# Patient Record
Sex: MALE | Race: WHITE
[De-identification: names, ages, dates, MRNs, and addresses within clinical notes are randomized per-mention and may not be internally consistent; named-entity substitution may affect disease eponyms.]

---

## 2018-02-02 ENCOUNTER — HOSPITAL ENCOUNTER (INPATIENT)
Dept: HOSPITAL 17 - PHED | Age: 73
LOS: 1 days | Discharge: HOME | DRG: 389 | End: 2018-02-03
Attending: HOSPITALIST | Admitting: HOSPITALIST
Payer: OTHER GOVERNMENT

## 2018-02-02 VITALS
HEART RATE: 82 BPM | TEMPERATURE: 98.7 F | OXYGEN SATURATION: 94 % | DIASTOLIC BLOOD PRESSURE: 71 MMHG | SYSTOLIC BLOOD PRESSURE: 119 MMHG | RESPIRATION RATE: 20 BRPM

## 2018-02-02 VITALS
HEART RATE: 71 BPM | RESPIRATION RATE: 18 BRPM | SYSTOLIC BLOOD PRESSURE: 120 MMHG | OXYGEN SATURATION: 97 % | DIASTOLIC BLOOD PRESSURE: 71 MMHG

## 2018-02-02 VITALS
SYSTOLIC BLOOD PRESSURE: 105 MMHG | RESPIRATION RATE: 20 BRPM | DIASTOLIC BLOOD PRESSURE: 69 MMHG | OXYGEN SATURATION: 98 % | HEART RATE: 68 BPM

## 2018-02-02 VITALS
SYSTOLIC BLOOD PRESSURE: 106 MMHG | TEMPERATURE: 98 F | DIASTOLIC BLOOD PRESSURE: 77 MMHG | OXYGEN SATURATION: 96 % | RESPIRATION RATE: 18 BRPM | HEART RATE: 66 BPM

## 2018-02-02 VITALS — OXYGEN SATURATION: 95 %

## 2018-02-02 VITALS — HEIGHT: 70 IN | BODY MASS INDEX: 24.87 KG/M2 | WEIGHT: 173.72 LBS

## 2018-02-02 VITALS
DIASTOLIC BLOOD PRESSURE: 62 MMHG | OXYGEN SATURATION: 99 % | HEART RATE: 66 BPM | SYSTOLIC BLOOD PRESSURE: 105 MMHG | RESPIRATION RATE: 20 BRPM | TEMPERATURE: 97 F

## 2018-02-02 VITALS
OXYGEN SATURATION: 97 % | RESPIRATION RATE: 18 BRPM | DIASTOLIC BLOOD PRESSURE: 75 MMHG | SYSTOLIC BLOOD PRESSURE: 114 MMHG | HEART RATE: 64 BPM

## 2018-02-02 VITALS
OXYGEN SATURATION: 95 % | RESPIRATION RATE: 18 BRPM | SYSTOLIC BLOOD PRESSURE: 116 MMHG | HEART RATE: 61 BPM | TEMPERATURE: 97.9 F | DIASTOLIC BLOOD PRESSURE: 72 MMHG

## 2018-02-02 VITALS
OXYGEN SATURATION: 94 % | RESPIRATION RATE: 20 BRPM | DIASTOLIC BLOOD PRESSURE: 81 MMHG | SYSTOLIC BLOOD PRESSURE: 114 MMHG | HEART RATE: 70 BPM | TEMPERATURE: 97 F

## 2018-02-02 VITALS
SYSTOLIC BLOOD PRESSURE: 112 MMHG | RESPIRATION RATE: 18 BRPM | HEART RATE: 68 BPM | OXYGEN SATURATION: 93 % | TEMPERATURE: 98.3 F | DIASTOLIC BLOOD PRESSURE: 70 MMHG

## 2018-02-02 VITALS
DIASTOLIC BLOOD PRESSURE: 75 MMHG | HEART RATE: 71 BPM | SYSTOLIC BLOOD PRESSURE: 114 MMHG | OXYGEN SATURATION: 97 % | RESPIRATION RATE: 20 BRPM

## 2018-02-02 VITALS — OXYGEN SATURATION: 96 %

## 2018-02-02 DIAGNOSIS — K52.9: ICD-10-CM

## 2018-02-02 DIAGNOSIS — E86.0: ICD-10-CM

## 2018-02-02 DIAGNOSIS — K86.89: ICD-10-CM

## 2018-02-02 DIAGNOSIS — E87.1: ICD-10-CM

## 2018-02-02 DIAGNOSIS — N20.0: ICD-10-CM

## 2018-02-02 DIAGNOSIS — K56.7: Primary | ICD-10-CM

## 2018-02-02 DIAGNOSIS — N28.1: ICD-10-CM

## 2018-02-02 DIAGNOSIS — F10.20: ICD-10-CM

## 2018-02-02 DIAGNOSIS — K76.89: ICD-10-CM

## 2018-02-02 LAB
ALBUMIN SERPL-MCNC: 4.1 GM/DL (ref 3.4–5)
ALP SERPL-CCNC: 75 U/L (ref 45–117)
ALT SERPL-CCNC: 24 U/L (ref 12–78)
AST SERPL-CCNC: 31 U/L (ref 15–37)
BACTERIA #/AREA URNS HPF: (no result) /HPF
BASOPHILS # BLD AUTO: 0.2 TH/MM3 (ref 0–0.2)
BASOPHILS NFR BLD: 2.2 % (ref 0–2)
BILIRUB SERPL-MCNC: 0.8 MG/DL (ref 0.2–1)
BUN SERPL-MCNC: 25 MG/DL (ref 7–18)
CALCIUM SERPL-MCNC: 10 MG/DL (ref 8.5–10.1)
CHLORIDE SERPL-SCNC: 100 MEQ/L (ref 98–107)
COLOR UR: YELLOW
CREAT SERPL-MCNC: 1.2 MG/DL (ref 0.6–1.3)
EOSINOPHIL # BLD: 0 TH/MM3 (ref 0–0.4)
EOSINOPHIL NFR BLD: 0.1 % (ref 0–4)
ERYTHROCYTE [DISTWIDTH] IN BLOOD BY AUTOMATED COUNT: 11.9 % (ref 11.6–17.2)
GFR SERPLBLD BASED ON 1.73 SQ M-ARVRAT: 59 ML/MIN (ref 89–?)
GLUCOSE SERPL-MCNC: 132 MG/DL (ref 74–106)
GLUCOSE UR STRIP-MCNC: (no result) MG/DL
HCO3 BLD-SCNC: 27.3 MEQ/L (ref 21–32)
HCT VFR BLD CALC: 44.9 % (ref 39–51)
HGB BLD-MCNC: 14.9 GM/DL (ref 13–17)
HGB UR QL STRIP: (no result)
KETONES UR STRIP-MCNC: 15 MG/DL
LEUKOCYTE ESTERASE UR QL STRIP: (no result) /HPF (ref 0–5)
LYMPHOCYTES # BLD AUTO: 0.8 TH/MM3 (ref 1–4.8)
LYMPHOCYTES NFR BLD AUTO: 6.8 % (ref 9–44)
MCH RBC QN AUTO: 32.7 PG (ref 27–34)
MCHC RBC AUTO-ENTMCNC: 33.3 % (ref 32–36)
MCV RBC AUTO: 98.2 FL (ref 80–100)
MONOCYTE #: 0.3 TH/MM3 (ref 0–0.9)
MONOCYTES NFR BLD: 3 % (ref 0–8)
NEUTROPHILS # BLD AUTO: 9.9 TH/MM3 (ref 1.8–7.7)
NEUTROPHILS NFR BLD AUTO: 87.9 % (ref 16–70)
NITRITE UR QL STRIP: (no result)
PLATELET # BLD: 132 TH/MM3 (ref 150–450)
PMV BLD AUTO: 10.6 FL (ref 7–11)
PROT SERPL-MCNC: 7.5 GM/DL (ref 6.4–8.2)
RBC # BLD AUTO: 4.58 MIL/MM3 (ref 4.5–5.9)
RBC #/AREA URNS HPF: (no result) /HPF (ref 0–3)
SODIUM SERPL-SCNC: 135 MEQ/L (ref 136–145)
SP GR UR STRIP: 1.02 (ref 1–1.03)
SQUAMOUS #/AREA URNS HPF: (no result) /HPF (ref 0–5)
URINE LEUKOCYTE ESTERASE: (no result)
WBC # BLD AUTO: 11.2 TH/MM3 (ref 4–11)

## 2018-02-02 PROCEDURE — 83690 ASSAY OF LIPASE: CPT

## 2018-02-02 PROCEDURE — 96375 TX/PRO/DX INJ NEW DRUG ADDON: CPT

## 2018-02-02 PROCEDURE — 83735 ASSAY OF MAGNESIUM: CPT

## 2018-02-02 PROCEDURE — 81001 URINALYSIS AUTO W/SCOPE: CPT

## 2018-02-02 PROCEDURE — 96374 THER/PROPH/DIAG INJ IV PUSH: CPT

## 2018-02-02 PROCEDURE — 85025 COMPLETE CBC W/AUTO DIFF WBC: CPT

## 2018-02-02 PROCEDURE — 80053 COMPREHEN METABOLIC PANEL: CPT

## 2018-02-02 PROCEDURE — 74019 RADEX ABDOMEN 2 VIEWS: CPT

## 2018-02-02 PROCEDURE — 96361 HYDRATE IV INFUSION ADD-ON: CPT

## 2018-02-02 PROCEDURE — 74177 CT ABD & PELVIS W/CONTRAST: CPT

## 2018-02-02 RX ADMIN — Medication SCH ML: at 21:27

## 2018-02-02 RX ADMIN — Medication SCH ML: at 09:00

## 2018-02-02 RX ADMIN — PHENYTOIN SODIUM SCH MLS/HR: 50 INJECTION INTRAMUSCULAR; INTRAVENOUS at 16:44

## 2018-02-02 RX ADMIN — PHENYTOIN SODIUM SCH MLS/HR: 50 INJECTION INTRAMUSCULAR; INTRAVENOUS at 06:40

## 2018-02-02 NOTE — HHI.HP
__________________________________________________





[facility]


Service


[facility]


Primary Care Physician


[facility]


Admission Diagnosis





small bowel obstruction, dehydration


Diagnoses:  


Travel History


International Travel<30 Days:  No


Contact w/Intl Traveler <30 Da:  No


Traveled to Known Affected Are:  No


History of Present Illness


This patient is a 72-year-old gentleman with minimal past medical history comes 

in with acute onset of abdominal pain with associated vomiting.  The pain is 

described as 8 out of 10, constant and sharp and worse in the left side.  It is 

improved with IV Dilaudid.  The pain goes from 8-6.  He used to be a heavy 

drinker but now drinks only 2 beers a day.  He is active and exercises daily.  

In the past he has had problems with pancreatitis and was prescribed pancreas 

enzymes however had stopped taking them.  He now takes ibuprofen as needed for 

pain.  Patient has not had a fever or chills.  He is mildly elevated white cell 

count and some mild hyponatremia.  The patient's recommend for admission due to 

obstruction which is seen clinically and on CT of abdomen pelvis





Review of Systems


Constitutional:  DENIES: Diaphoretic episodes, Fatigue, Fever, Weight gain, 

Weight loss, Chills, Dizziness, Change in appetite, Night Sweats


Endocrine:  DENIES: Heat/cold intolerance, Polydipsia, Polyuria, Polyphagia


Eyes:  DENIES: Blurred vision, Diplopia, Eye inflammation, Eye pain, Vision loss

, Photosensitivity, Double Vision


Ears, nose, mouth, throat:  DENIES: Tinnitus, Hearing loss, Vertigo, Nasal 

discharge, Oral lesions, Throat pain, Hoarseness, Ear Pain, Running Nose, 

Epistaxis, Sinus Pain, Toothache, Odynophagia


Respiratory:  DENIES: Apneas, Cough, Snoring, Wheezing, Hemoptysis, Sputum 

production, Shortness of breath


Cardiovascular:  DENIES: Chest pain, Palpitations, Syncope, Dyspnea on Exertion

, PND, Lower Extremity Edema, Orthopnea, Claudication


Gastrointestinal:  COMPLAINS OF: Abdominal pain, Nausea


Genitourinary:  DENIES: Sexual dysfunction, Urinary frequency, Urinary 

incontinence, Urgency, Hematuria, Dysuria, Nocturia, Penile Discharge, 

Testicular Pain, Testicular Swelling


Integumentary:  DENIES: Abnormal pigmentation, Nail changes, Pruritus, Rash


Hematologic/lymphatic:  DENIES: Bruising, Lymphadenopathy


Immunologic/allergic:  DENIES: Eczema, Urticaria


Neurologic:  DENIES: Abnormal gait, Headache, Localized weakness, Paresthesias, 

Seizures, Speech Problems, Tremor, Poor Balance


Psychiatric:  DENIES: Anxiety, Confusion, Mood changes, Depression, 

Hallucinations, Agitation, Suicidal Ideation, Homicidal Ideation, Delusions


Except as stated in HPI:  all other systems reviewed are Neg





Past Family Social History


Past Medical History


History of pancreatic insufficiency


Past Surgical History


Left inguinal hernia repair


Reported Medications


Reviewed in the EMR


Allergies:  


Coded Allergies:  


     No Known Allergies (Unverified , 18)


Active Ordered Medications


Reviewed in the EMR


Family History


Mother  from cancer at 65 father  from old age and a 4


Social History


No current tobacco, alcohol daily but previous history of excessive alcohol 

dependency and pancreatic insufficiency





Physical Exam


Vital Signs





Vital Signs








  Date Time  Temp Pulse Resp B/P (MAP) Pulse Ox O2 Delivery O2 Flow Rate FiO2


 


18 08:26     95   21


 


18 08:12     96 Nasal Cannula 2.00 


 


18 08:00 97.9 61 18 116/72 (87) 95   


 


18 06:24 97.0 70 20 114/81 (92) 94   


 


18 05:54  68 20 105/69 (81) 98   


 


18 04:50   16     


 


18 04:31  71 20 114/75 (88) 97   


 


18 04:23  64 18 114/75 (88) 97 Room Air  


 


18 03:09  71 18 120/71 (87) 97   


 


18 02:30   20     


 


18 02:05 97.0 66 20 105/62 (76) 99   








Physical Exam


GENERAL: This is a well-nourished, well-developed patient, complaining of 

abdominal pain


SKIN: No rashes, ecchymoses or lesions. Cool and dry.


HEAD: Atraumatic. Normocephalic. No temporal or scalp tenderness.


EYES: Pupils equal round and reactive. Extraocular motions intact. No scleral 

icterus. No injection or drainage. 


ENT: Nose without bleeding, purulent drainage or septal hematoma. Throat 

without erythema, tonsillar hypertrophy or exudate. Uvula midline. Airway 

patent.


NECK: Trachea midline. No JVD or lymphadenopathy. Supple, nontender, no 

meningeal signs.


CARDIOVASCULAR: Regular rate and rhythm without murmurs, gallops, or rubs. 


RESPIRATORY: Clear to auscultation. Breath sounds equal bilaterally. No wheezes

, rales, or rhonchi.  


GASTROINTESTINAL: Abdomen is soft, mildly distended, tender diffusely with 

hypoactive bowel sounds


MUSCULOSKELETAL: Extremities without clubbing, cyanosis, or edema. No joint 

tenderness, effusion, or edema noted. No calf tenderness. Negative Homans sign 

bilaterally.


NEUROLOGICAL: Awake and alert. Cranial nerves II through XII intact.  Motor and 

sensory grossly within normal limits. Five out of 5 muscle strength in all 

muscle groups.  Normal speech.


Laboratory





Laboratory Tests








Test


  18


02:30 18


04:20


 


White Blood Count 11.2  


 


Red Blood Count 4.58  


 


Hemoglobin 14.9  


 


Hematocrit 44.9  


 


Mean Corpuscular Volume 98.2  


 


Mean Corpuscular Hemoglobin 32.7  


 


Mean Corpuscular Hemoglobin


Concent 33.3 


  


 


 


Red Cell Distribution Width 11.9  


 


Platelet Count 132  


 


Mean Platelet Volume 10.6  


 


Neutrophils (%) (Auto) 87.9  


 


Lymphocytes (%) (Auto) 6.8  


 


Monocytes (%) (Auto) 3.0  


 


Eosinophils (%) (Auto) 0.1  


 


Basophils (%) (Auto) 2.2  


 


Neutrophils # (Auto) 9.9  


 


Lymphocytes # (Auto) 0.8  


 


Monocytes # (Auto) 0.3  


 


Eosinophils # (Auto) 0.0  


 


Basophils # (Auto) 0.2  


 


CBC Comment DIFF FINAL  


 


Differential Comment   


 


Blood Urea Nitrogen 25  


 


Creatinine 1.20  


 


Random Glucose 132  


 


Total Protein 7.5  


 


Albumin 4.1  


 


Calcium Level 10.0  


 


Alkaline Phosphatase 75  


 


Aspartate Amino Transf


(AST/SGOT) 31 


  


 


 


Alanine Aminotransferase


(ALT/SGPT) 24 


  


 


 


Total Bilirubin 0.8  


 


Sodium Level 135  


 


Potassium Level 4.5  


 


Chloride Level 100  


 


Carbon Dioxide Level 27.3  


 


Anion Gap 8  


 


Estimat Glomerular Filtration


Rate 59 


  


 


 


Lipase 171  


 


Urine Color  YELLOW 


 


Urine Turbidity  CLEAR 


 


Urine pH  6.5 


 


Urine Specific Gravity  1.025 


 


Urine Protein  TRACE 


 


Urine Glucose (UA)  NEG 


 


Urine Ketones  15 


 


Urine Occult Blood  SMALL 


 


Urine Nitrite  NEG 


 


Urine Bilirubin  NEG 


 


Urine Leukocyte Esterase  NEG 


 


Urine RBC  0-3 


 


Urine WBC  0-2 


 


Urine Squamous Epithelial


Cells 


  0-5 


 


 


Urine Bacteria  NONE 


 


Microscopic Urinalysis Comment


  


  CULT NOT


INDICATED








Result Diagram:  


18 0230                                                                    

            18 0230





Imaging





Last Impressions








Abdomen/Pelvis CT 18 0353 Signed





Impressions: 





 Service Date/Time:  2018 04:08 - CONCLUSION:  1. Small 

bowel 





 obstruction with transition point in the right lower quadrant. 2. Hepatic 

cysts 





 and renal cysts are noted. 3. Nonobstructing left lower pole renal calculus.  

   





 Craig A. Roberto, MD Caprini VTE Risk Assessment


Caprini VTE Risk Assessment:  Mod/High Risk (score >= 2)


Caprini Risk Assessment Model











 Point Value = 1          Point Value = 2  Point Value = 3  Point Value = 5


 


Age 41-60


Minor surgery


BMI > 25 kg/m2


Swollen legs


Varicose veins


Pregnancy or postpartum


History of unexplained or recurrent


   spontaneous 


Oral contraceptives or hormone


   replacement


Sepsis (< 1 month)


Serious lung disease, including


   pneumonia (< 1 month)


Abnormal pulmonary function


Acute myocardial infarction


Congestive heart failure (< 1 month)


History of inflammatory bowel disease


Medical patient at bed rest Age 61-74


Arthroscopic surgery


Major open surgery (> 45 min)


Laparoscopic surgery (> 45 min)


Malignancy


Confined to bed (> 72 hours)


Immobilizing plaster cast


Central venous access Age >= 75


History of VTE


Family history of VTE


Factor V Leiden


Prothrombin 52050J


Lupus anticoagulant


Anticardiolipin antibodies


Elevated serum homocysteine


Heparin-induced thrombocytopenia


Other congenital or acquired


   thrombophilia Stroke (< 1 month)


Elective arthroplasty


Hip, pelvis, or leg fracture


Acute spinal cord injury (< 1 month)








Prophylaxis Regimen











   Total Risk


Factor Score Risk Level Prophylaxis Regimen


 


0-1      Low Early ambulation


 


2 Moderate Order ONE of the following:


*Sequential Compression Device (SCD)


*Heparin 5000 units SQ BID


 


3-4 Higher Order ONE of the following medications:


*Heparin 5000 units SQ TID


*Enoxaparin/Lovenox 40 mg SQ daily (WT < 150 kg, CrCl > 30 mL/min)


*Enoxaparin/Lovenox 30 mg SQ daily (WT < 150 kg, CrCl > 10-29 mL/min)


*Enoxaparin/Lovenox 30 mg SQ BID (WT < 150 kg, CrCl > 30 mL/min)


AND/OR


*Sequential Compression Device (SCD)


 


5 or more Highest Order ONE of the following medications:


*Heparin 5000 units SQ TID (Preferred with Epidurals)


*Enoxaparin/Lovenox 40 mg SQ daily (WT < 150 kg, CrCl > 30 mL/min)


*Enoxaparin/Lovenox 30 mg SQ daily (WT < 150 kg, CrCl > 10-29 mL/min)


*Enoxaparin/Lovenox 30 mg SQ BID (WT < 150 kg, CrCl > 30 mL/min)


AND


*Sequential Compression Device (SCD)











Assessment and Plan


Problem List:  


(1) Small bowel obstruction


ICD Code:  K56.609 - Unspecified intestinal obstruction, unspecified as to 

partial versus complete obstruction


Status:  Acute


Plan:  we will continue with IV hydration, nothing by mouth status, encourage 

ambulation and continue with IV dilaudid for pain 


follow up flat and upright abdomen encourage ambulation








Physician Certification


2 Midnight Certification Type:  Admission for Inpatient Services


Order for Inpatient Services


The services are ordered in accordance with Medicare regulations or non-

Medicare payer requirements, as applicable.  In the case of services not 

specified as inpatient-only, they are appropriately provided as inpatient 

services in accordance with the 2-midnight benchmark.


Estimated LOS (days):  3


3 days is the estimated time the patient will need to remain in the hospital, 

assuming treatment plan goals are met and no additional complications.


Post-Hospital Plan:  Home











Velia Perales MD 2018 13:21

## 2018-02-02 NOTE — PD
HPI


Chief Complaint:  Abdominal Pain


Time Seen by Provider:  03:53


Travel History


International Travel<30 days:  No


Contact w/Intl Traveler<30days:  No


Traveled to known affect area:  No





History of Present Illness


HPI


The patient is a 72-year-old male that complains of abdominal pain/vomiting 

since 1 PM tonight.  The abdominal pain is periumbilical and involves all 4 

quadrants diffusely.  He has never had surgery and still has his appendix and 

gallbladder.  The pain is 8/10, constant and sharp.  The patient states he used 

to be a heavy drinker and had a problem with pancreatitis.  He states he has 

never had this type of problem before.  He no longer is a heavy drinker.





PFS


Past Medical History


Diminished Hearing:  No


Inguinal Hernia:  Yes


Tetanus Vaccination:  Unknown


Influenza Vaccination:  Yes





Past Surgical History


Other Surgery:  Yes (left inguinal hernia repair )





Social History


Alcohol Use:  Yes (2 beers daily)


Tobacco Use:  No


Substance Use:  No





Allergies-Medications


(Allergen,Severity, Reaction):  


Coded Allergies:  


     No Known Allergies (Unverified , 2/2/18)


Reported Meds & Prescriptions





Reported Meds & Active Scripts


Active


Reported


Ibuprofen 400 Mg Tab 400 Mg PO DAILY PRN








Review of Systems


Except as stated in HPI:  all other systems reviewed are Neg





Physical Exam


Narrative


GENERAL: The patient is alert, oriented 3 in moderate apparent distress with 

his abdominal discomfort.  His vital signs are normal.


SKIN: Focused skin assessment warm/dry.


HEAD: Atraumatic. Normocephalic. 


EYES: Pupils equal and round. No scleral icterus. No injection or drainage. 


ENT: No nasal bleeding or discharge.  Mucous membranes pink and moist.


NECK: Trachea midline. No JVD. 


CARDIOVASCULAR: Regular rate and rhythm.  No murmur appreciated.


RESPIRATORY: No accessory muscle use. Clear to auscultation. Breath sounds 

equal bilaterally. 


GASTROINTESTINAL: Abdomen soft, with diffuse tenderness in the periumbilical 

area and bilateral lower quadrants, nondistended. Hepatic and splenic margins 

not palpable.  No guarding or rebound is present.


MUSCULOSKELETAL: No obvious deformities. No clubbing.  No cyanosis.  No edema. 


NEUROLOGICAL: Awake and alert. No obvious cranial nerve deficits.  Motor 

grossly within normal limits. Normal speech.


PSYCHIATRIC: Appropriate mood and affect; insight and judgment normal.





Data


Data


Last Documented VS





Vital Signs








  Date Time  Temp Pulse Resp B/P (MAP) Pulse Ox O2 Delivery O2 Flow Rate FiO2


 


2/2/18 04:50   16     


 


2/2/18 04:31  71  114/75 (88) 97   


 


2/2/18 02:05 97.0       








Orders





 Orders


Complete Blood Count With Diff (2/2/18 02:22)


Comprehensive Metabolic Panel (2/2/18 02:22)


Urinalysis - C+S If Indicated (2/2/18 02:22)


Lipase (2/2/18 02:22)


Ct Abd/Pel W Iv Contrast(Rout) (2/2/18 03:53)


Hydromorphone Pf Inj (Dilaudid Pf Inj) (2/2/18 04:00)


Ondansetron Inj (Zofran Inj) (2/2/18 04:00)


Sodium Chlor 0.9% 1000 Ml Inj (Ns 1000 M (2/2/18 03:53)


Iohexol 350 Inj (Omnipaque 350 Inj) (2/2/18 04:00)


Place In Observation (2/2/18 )


Vital Signs (Adult) Q4H (2/2/18 05:15)


Activity Oob With Assistance (2/2/18 05:15)


Diet Npo (2/2/18 Breakfast)


Sodium Chlor 0.9% 1000 Ml Inj (Ns 1000 M (2/2/18 05:15)


Sodium Chloride 0.9% Flush (Ns Flush) (2/2/18 05:15)


Sodium Chloride 0.9% Flush (Ns Flush) (2/2/18 09:00)


Ondansetron Inj (Zofran Inj) (2/2/18 05:15)


Comprehensive Metabolic Panel (2/3/18 06:00)


Complete Blood Count With Diff (2/3/18 06:00)


Naloxone Inj (Narcan Inj) (2/2/18 05:15)


Docusate Sodium-Senna (Eliz-Colace) (2/2/18 09:00)


Magnesium Hydroxide Liq (Milk Of Magnesi (2/2/18 05:15)


Sennosides (Senokot) (2/2/18 05:15)


Bisacodyl Supp (Dulcolax Supp) (2/2/18 05:15)


Lactulose Liq (Lactulose Liq) (2/2/18 05:15)


Consult General Surgery (2/2/18 )


Hydromorphone Pf Inj (Dilaudid Pf Inj) (2/2/18 05:15)


(Hub Use Only)Inp Phy Cons/Ref (2/2/18 )


Admit Order (Ed Use Only) (2/2/18 05:41)





Labs





Laboratory Tests








Test


  2/2/18


02:30 2/2/18


04:20


 


White Blood Count 11.2 TH/MM3  


 


Red Blood Count 4.58 MIL/MM3  


 


Hemoglobin 14.9 GM/DL  


 


Hematocrit 44.9 %  


 


Mean Corpuscular Volume 98.2 FL  


 


Mean Corpuscular Hemoglobin 32.7 PG  


 


Mean Corpuscular Hemoglobin


Concent 33.3 % 


  


 


 


Red Cell Distribution Width 11.9 %  


 


Platelet Count 132 TH/MM3  


 


Mean Platelet Volume 10.6 FL  


 


Neutrophils (%) (Auto) 87.9 %  


 


Lymphocytes (%) (Auto) 6.8 %  


 


Monocytes (%) (Auto) 3.0 %  


 


Eosinophils (%) (Auto) 0.1 %  


 


Basophils (%) (Auto) 2.2 %  


 


Neutrophils # (Auto) 9.9 TH/MM3  


 


Lymphocytes # (Auto) 0.8 TH/MM3  


 


Monocytes # (Auto) 0.3 TH/MM3  


 


Eosinophils # (Auto) 0.0 TH/MM3  


 


Basophils # (Auto) 0.2 TH/MM3  


 


CBC Comment DIFF FINAL  


 


Differential Comment   


 


Blood Urea Nitrogen 25 MG/DL  


 


Creatinine 1.20 MG/DL  


 


Random Glucose 132 MG/DL  


 


Total Protein 7.5 GM/DL  


 


Albumin 4.1 GM/DL  


 


Calcium Level 10.0 MG/DL  


 


Alkaline Phosphatase 75 U/L  


 


Aspartate Amino Transf


(AST/SGOT) 31 U/L 


  


 


 


Alanine Aminotransferase


(ALT/SGPT) 24 U/L 


  


 


 


Total Bilirubin 0.8 MG/DL  


 


Sodium Level 135 MEQ/L  


 


Potassium Level 4.5 MEQ/L  


 


Chloride Level 100 MEQ/L  


 


Carbon Dioxide Level 27.3 MEQ/L  


 


Anion Gap 8 MEQ/L  


 


Estimat Glomerular Filtration


Rate 59 ML/MIN 


  


 


 


Lipase 171 U/L  


 


Urine Color  YELLOW 


 


Urine Turbidity  CLEAR 


 


Urine pH  6.5 


 


Urine Specific Gravity  1.025 


 


Urine Protein  TRACE mg/dL 


 


Urine Glucose (UA)  NEG mg/dL 


 


Urine Ketones  15 mg/dL 


 


Urine Occult Blood  SMALL 


 


Urine Nitrite  NEG 


 


Urine Bilirubin  NEG 


 


Urine Leukocyte Esterase  NEG 


 


Urine RBC  0-3 /hpf 


 


Urine WBC  0-2 /hpf 


 


Urine Squamous Epithelial


Cells 


  0-5 /hpf 


 


 


Urine Bacteria  NONE /hpf 


 


Microscopic Urinalysis Comment


  


  CULT NOT


INDICATED











MDM


Medical Decision Making


Medical Screen Exam Complete:  Yes


Emergency Medical Condition:  Yes


Medical Record Reviewed:  Yes


Interpretation(s)


The CBC shows a white count 11,200 with 88% neutrophils but is otherwise 

unremarkable.  The complete metabolic profile shows a sodium of 135, GFR of 59 

BUN 25, glucose 132 but is otherwise normal.  The lipase is normal.  The CT 

abdomen pelvis shows a small obstruction with transition point in the right 

lower quadrant.  Incidentally noted are hepatic cyst and renal cysts and 

nonobstructing left lower pole renal calculus.  The urine shows 15 ketones, 

small occult blood but is otherwise normal and culture is not indicated.


Differential Diagnosis


Small bowel obstruction, appendicitis, colitis, urinary tract infection, 

dehydration, electrolyte disorder, pancreatitis


Narrative Course


The patient has a small bowel obstruction.  The patient has no previous 

surgeries on the abdomen and this is somewhat unusual that he has a small bowel 

obstruction without any apparent intra-abdominal scarring.  The patient will be 

admitted to the [facility] service for fluids, nothing by mouth.  If this does not 

cause resolution of the obstruction then surgery needs to be consulted.  

Patient has not vomited in emergency department and is fairly comfortable.  The 

NG tube will be deferred at this time.  The patient also has mild dehydration.





Diagnosis





 Primary Impression:  


 Small bowel obstruction


 Additional Impression:  


 Mild dehydration





Admitting Information


Admitting Physician Requests:  Antoni Roberto MD Feb 2, 2018 03:59

## 2018-02-02 NOTE — RADRPT
EXAM DATE/TIME:  02/02/2018 04:08 

 

[facility] COMPARISON:     

No previous studies available for comparison.

 

 

INDICATIONS :     

Generalized abdominal pain and vomiting for 12 hours

                      

 

IV CONTRAST:     

96 cc Omnipaque 350 (iohexol) IV 

 

 

ORAL CONTRAST:      

No oral contrast ingested.

                      

 

RADIATION DOSE:     

8.49 CTDIvol (mGy) 

 

 

MEDICAL HISTORY :     

None  

 

SURGICAL HISTORY :      

Inguinal hernia repair. 

 

ENCOUNTER:      

Initial

 

ACUITY:      

1 day

 

PAIN SCALE:      

8/10

 

LOCATION:         

adominal

 

TECHNIQUE:     

Volumetric scanning of the abdomen and pelvis was performed.  Using automated exposure control and ad
justment of the mA and/or kV according to patient size, radiation dose was kept as low as reasonably 
achievable to obtain optimal diagnostic quality images.  DICOM format image data is available electro
nically for review and comparison.  

 

FINDINGS:     

Lung bases are clear. There are degenerative changes of the spine noted. There is a small pericardial
 effusion or numerous cysts are present throughout the hepatic parenchyma the largest in the left lob
e measuring up to 2.3 cm. Spleen, pancreas, adrenals are unremarkable. There is a nonobstructing left
 lower pole 4.4 mm calculus, and a 5 mm cyst at the lower pole of left kidney. At the midpole of the 
right kidney a 2.6 cm cyst is noted, and at the upper pole of the right kidney an exophytic 5.8 cm si
mple cyst is present. Urinary bladder and prostate are unremarkable. There is trace free fluid in the
 pelvis. Appendix normal. There are dilated fluid-filled loops of small bowel present in the stomach 
is distended with fluid. This is characteristic of a small bowel obstruction. Distal ileal loops are 
markedly decompressed. There is trace mesenteric fluid/edema in the right lower quadrant. A transitio
n point is suspected on axial image 41 in the right lower quadrant. Small fat-containing umbilical he
rnia.

 

CONCLUSION:     

1. Small bowel obstruction with transition point in the right lower quadrant. 2. Hepatic cysts and re
nal cysts are noted. 3. Nonobstructing left lower pole renal calculus.

 

 

 

 Julio Silveira MD on February 02, 2018 at 4:30           

Board Certified Radiologist.

 This report was verified electronically.

## 2018-02-03 VITALS
SYSTOLIC BLOOD PRESSURE: 110 MMHG | DIASTOLIC BLOOD PRESSURE: 69 MMHG | OXYGEN SATURATION: 95 % | RESPIRATION RATE: 15 BRPM | TEMPERATURE: 97.8 F | HEART RATE: 52 BPM

## 2018-02-03 VITALS
HEART RATE: 71 BPM | TEMPERATURE: 97.4 F | SYSTOLIC BLOOD PRESSURE: 113 MMHG | RESPIRATION RATE: 20 BRPM | OXYGEN SATURATION: 95 % | DIASTOLIC BLOOD PRESSURE: 65 MMHG

## 2018-02-03 LAB
ALBUMIN SERPL-MCNC: 3 GM/DL (ref 3.4–5)
ALP SERPL-CCNC: 54 U/L (ref 45–117)
ALT SERPL-CCNC: 15 U/L (ref 12–78)
AST SERPL-CCNC: 19 U/L (ref 15–37)
BASOPHILS # BLD AUTO: 0 TH/MM3 (ref 0–0.2)
BASOPHILS NFR BLD: 0.4 % (ref 0–2)
BILIRUB SERPL-MCNC: 0.9 MG/DL (ref 0.2–1)
BUN SERPL-MCNC: 16 MG/DL (ref 7–18)
CALCIUM SERPL-MCNC: 7.9 MG/DL (ref 8.5–10.1)
CHLORIDE SERPL-SCNC: 108 MEQ/L (ref 98–107)
CREAT SERPL-MCNC: 1.1 MG/DL (ref 0.6–1.3)
EOSINOPHIL # BLD: 0.2 TH/MM3 (ref 0–0.4)
EOSINOPHIL NFR BLD: 3 % (ref 0–4)
ERYTHROCYTE [DISTWIDTH] IN BLOOD BY AUTOMATED COUNT: 12.4 % (ref 11.6–17.2)
GFR SERPLBLD BASED ON 1.73 SQ M-ARVRAT: 66 ML/MIN (ref 89–?)
GLUCOSE SERPL-MCNC: 78 MG/DL (ref 74–106)
HCO3 BLD-SCNC: 27.6 MEQ/L (ref 21–32)
HCT VFR BLD CALC: 35.2 % (ref 39–51)
HGB BLD-MCNC: 12.5 GM/DL (ref 13–17)
LYMPHOCYTES # BLD AUTO: 1.2 TH/MM3 (ref 1–4.8)
LYMPHOCYTES NFR BLD AUTO: 19.3 % (ref 9–44)
MAGNESIUM SERPL-MCNC: 1.8 MG/DL (ref 1.5–2.5)
MCH RBC QN AUTO: 35.1 PG (ref 27–34)
MCHC RBC AUTO-ENTMCNC: 35.5 % (ref 32–36)
MCV RBC AUTO: 98.8 FL (ref 80–100)
MONOCYTE #: 0.5 TH/MM3 (ref 0–0.9)
MONOCYTES NFR BLD: 7.7 % (ref 0–8)
NEUTROPHILS # BLD AUTO: 4.4 TH/MM3 (ref 1.8–7.7)
NEUTROPHILS NFR BLD AUTO: 69.6 % (ref 16–70)
PLATELET # BLD: 91 TH/MM3 (ref 150–450)
PMV BLD AUTO: 11.4 FL (ref 7–11)
PROT SERPL-MCNC: 5.7 GM/DL (ref 6.4–8.2)
RBC # BLD AUTO: 3.56 MIL/MM3 (ref 4.5–5.9)
SODIUM SERPL-SCNC: 139 MEQ/L (ref 136–145)
WBC # BLD AUTO: 6.3 TH/MM3 (ref 4–11)

## 2018-02-03 RX ADMIN — Medication SCH ML: at 08:54

## 2018-02-03 RX ADMIN — PHENYTOIN SODIUM SCH MLS/HR: 50 INJECTION INTRAMUSCULAR; INTRAVENOUS at 01:45

## 2018-02-03 RX ADMIN — PHENYTOIN SODIUM SCH MLS/HR: 50 INJECTION INTRAMUSCULAR; INTRAVENOUS at 11:15

## 2018-02-03 NOTE — HHI.PR
cc:   Tawanda Solano MD


__________________________________________________





Subjective


Subjective Notes





--------------------------------------------------------------------------------

------------------------------------------------------


DAILY PROGRESS NOTE FOR SURGICAL ATTENDING, DR. TAWANDA SOLANO


--------------------------------------------------------------------------------

--------------------------------------------------------


i had a bm


I feel much better


I think im over my virus





Objective


Vitals/I&O





Vital Signs








  Date Time  Temp Pulse Resp B/P (MAP) Pulse Ox O2 Delivery O2 Flow Rate FiO2


 


2/3/18 00:00 97.4 71 20 113/65 (81) 95   


 


2/2/18 08:26        21


 


2/2/18 08:12      Nasal Cannula 2.00 








Labs





Laboratory Tests








Test


  2/3/18


06:02


 


White Blood Count 6.3 


 


Red Blood Count 3.56 


 


Hemoglobin 12.5 


 


Hematocrit 35.2 


 


Mean Corpuscular Volume 98.8 


 


Mean Corpuscular Hemoglobin 35.1 


 


Mean Corpuscular Hemoglobin


Concent 35.5 


 


 


Red Cell Distribution Width 12.4 


 


Platelet Count 91 


 


Mean Platelet Volume 11.4 


 


Neutrophils (%) (Auto) 69.6 


 


Lymphocytes (%) (Auto) 19.3 


 


Monocytes (%) (Auto) 7.7 


 


Eosinophils (%) (Auto) 3.0 


 


Basophils (%) (Auto) 0.4 


 


Neutrophils # (Auto) 4.4 


 


Lymphocytes # (Auto) 1.2 


 


Monocytes # (Auto) 0.5 


 


Eosinophils # (Auto) 0.2 


 


Basophils # (Auto) 0.0 


 


CBC Comment AUTO DIFF 


 


Differential Comment


  AUTO DIFF


CONFIRMED


 


Platelet Estimate LOW 


 


Platelet Morphology Comment NORMAL 


 


Blood Urea Nitrogen 16 


 


Creatinine 1.10 


 


Random Glucose 78 


 


Total Protein 5.7 


 


Albumin 3.0 


 


Calcium Level 7.9 


 


Magnesium Level 1.8 


 


Alkaline Phosphatase 54 


 


Aspartate Amino Transf


(AST/SGOT) 19 


 


 


Alanine Aminotransferase


(ALT/SGPT) 15 


 


 


Total Bilirubin 0.9 


 


Sodium Level 139 


 


Potassium Level 4.1 


 


Chloride Level 108 


 


Carbon Dioxide Level 27.6 


 


Anion Gap 3 


 


Estimat Glomerular Filtration


Rate 66 


 








Radiology





Last Impressions








Abdomen X-Ray 2/3/18 0600 Signed





Impressions: 





 Service Date/Time:  Saturday, February 3, 2018 09:14 - CONCLUSION:  1. 





 Air-filled small bowel loops with a few air-fluid levels. The overall 

appearance 





 has improved from previous study.     Marco A Leos MD 


 


Abdomen/Pelvis CT 2/2/18 0353 Signed





Impressions: 





 Service Date/Time:  Friday, February 2, 2018 04:08 - CONCLUSION:  1. Small 

bowel 





 obstruction with transition point in the right lower quadrant. 2. Hepatic 

cysts 





 and renal cysts are noted. 3. Nonobstructing left lower pole renal calculus.  

   





 Julio Silveira MD 








Cardiovascular:  Regular


Lungs:  Clear


Abdomen:  Non-distended, Non-tender


Extremities:  Perfused





A/P


Problem List:  


(1) Small bowel obstruction


ICD Codes:  K56.609 - Unspecified intestinal obstruction, unspecified as to 

partial versus complete obstruction


Status:  Acute


(2) Mild dehydration


ICD Codes:  E86.0 - Dehydration


Status:  Acute


Assessment and Plan


72 year old with resolved ileus from virus





ok to dc





Attending Statement











--------------------------------------------------------------------------------

------------------------------------------------------


 NOTE FOR SURGICAL ATTENDING, DR. TAWANDA SOLANO


--------------------------------------------------------------------------------

--------------------------------------------------------








I attest that I had a face-to-face encounter with the patient on the same day, 

and personally performed and documented my assessment and findings in the 

medical record.





  





The following services were provided during this hospital visit:





   Chart data review, vital sign assessments/reviewing monitor data


   Review of consultations notes if present.


   Medication orders/review and/or management


   Ordering and/or reviewing lab tests


   Ordering and/or interpreting/reviewing x-rays and/or diagnostic studies 


   Care of the patient and discussion of the patient with the care team 


   Documentation time


   To help prompt me to consider important information that might be impacting 

today's encounter and assessment,


   information from prior notes written by myself or my colleagues may have 

been "brought forward/copy and pasted" into today's note.











Tawanda Solano MD Feb 3, 2018 10:03

## 2018-02-03 NOTE — HHI.PR
cc:   Tawanda Solano MD


__________________________________________________





Subjective


Subjective Notes





--------------------------------------------------------------------------------

------------------------------------------------------


DAILY PROGRESS NOTE FOR SURGICAL ATTENDING, DR. TAWANDA SOLANO


--------------------------------------------------------------------------------

--------------------------------------------------------


Up ambulating had a bowel movement





Objective


Vitals/I&O





Vital Signs








  Date Time  Temp Pulse Resp B/P (MAP) Pulse Ox O2 Delivery O2 Flow Rate FiO2


 


2/3/18 00:00 97.4 71 20 113/65 (81) 95   


 


2/2/18 08:26        21


 


2/2/18 08:12      Nasal Cannula 2.00 








Labs





Laboratory Tests








Test


  2/3/18


06:02


 


White Blood Count 6.3 


 


Red Blood Count 3.56 


 


Hemoglobin 12.5 


 


Hematocrit 35.2 


 


Mean Corpuscular Volume 98.8 


 


Mean Corpuscular Hemoglobin 35.1 


 


Mean Corpuscular Hemoglobin


Concent 35.5 


 


 


Red Cell Distribution Width 12.4 


 


Platelet Count 91 


 


Mean Platelet Volume 11.4 


 


Neutrophils (%) (Auto) 69.6 


 


Lymphocytes (%) (Auto) 19.3 


 


Monocytes (%) (Auto) 7.7 


 


Eosinophils (%) (Auto) 3.0 


 


Basophils (%) (Auto) 0.4 


 


Neutrophils # (Auto) 4.4 


 


Lymphocytes # (Auto) 1.2 


 


Monocytes # (Auto) 0.5 


 


Eosinophils # (Auto) 0.2 


 


Basophils # (Auto) 0.0 


 


CBC Comment AUTO DIFF 


 


Differential Comment


  AUTO DIFF


CONFIRMED


 


Platelet Estimate LOW 


 


Platelet Morphology Comment NORMAL 


 


Blood Urea Nitrogen 16 


 


Creatinine 1.10 


 


Random Glucose 78 


 


Total Protein 5.7 


 


Albumin 3.0 


 


Calcium Level 7.9 


 


Magnesium Level 1.8 


 


Alkaline Phosphatase 54 


 


Aspartate Amino Transf


(AST/SGOT) 19 


 


 


Alanine Aminotransferase


(ALT/SGPT) 15 


 


 


Total Bilirubin 0.9 


 


Sodium Level 139 


 


Potassium Level 4.1 


 


Chloride Level 108 


 


Carbon Dioxide Level 27.6 


 


Anion Gap 3 


 


Estimat Glomerular Filtration


Rate 66 


 








Radiology





Last Impressions








Abdomen X-Ray 2/3/18 0600 Signed





Impressions: 





 Service Date/Time:  Saturday, February 3, 2018 09:14 - CONCLUSION:  1. 





 Air-filled small bowel loops with a few air-fluid levels. The overall 

appearance 





 has improved from previous study.     Marco A Leos MD 


 


Abdomen/Pelvis CT 2/2/18 0353 Signed





Impressions: 





 Service Date/Time:  Friday, February 2, 2018 04:08 - CONCLUSION:  1. Small 

bowel 





 obstruction with transition point in the right lower quadrant. 2. Hepatic 

cysts 





 and renal cysts are noted. 3. Nonobstructing left lower pole renal calculus.  

   





 Julio Silveira MD 








Cardiovascular:  Regular


Lungs:  Clear


Abdomen:  Non-tender, Other


Extremities:  Perfused





A/P


Problem List:  


(1) Gastroenteritis


ICD Codes:  K52.9 - Noninfective gastroenteritis and colitis, unspecified


Status:  Acute


(2) Small bowel obstruction


ICD Codes:  K56.609 - Unspecified intestinal obstruction, unspecified as to 

partial versus complete obstruction


Status:  Acute


(3) Mild dehydration


ICD Codes:  E86.0 - Dehydration


Status:  Acute


(4) Ileus


ICD Codes:  K56.7 - Ileus, unspecified


Status:  Acute


Assessment and Plan


72-year-old gentleman who had a viral illness and is recovering from an ileus


Ambulating well


 also today had a bowel movement tolerating diet he should be able to go home 

today





Attending Statement











--------------------------------------------------------------------------------

------------------------------------------------------


 NOTE FOR SURGICAL ATTENDING, DR. TAWANDA SOLANO


--------------------------------------------------------------------------------

--------------------------------------------------------








I attest that I had a face-to-face encounter with the patient on the same day, 

and personally performed and documented my assessment and findings in the 

medical record.





  





The following services were provided during this hospital visit:





   Chart data review, vital sign assessments/reviewing monitor data


   Review of consultations notes if present.


   Medication orders/review and/or management


   Ordering and/or reviewing lab tests


   Ordering and/or interpreting/reviewing x-rays and/or diagnostic studies 


   Care of the patient and discussion of the patient with the care team 


   Documentation time


   To help prompt me to consider important information that might be impacting 

today's encounter and assessment,


   information from prior notes written by myself or my colleagues may have 

been "brought forward/copy and pasted" into today's note.











Tawanda Solano MD Feb 3, 2018 11:28

## 2018-02-03 NOTE — HHI.DCPOC
Discharge Care Plan


Diagnosis:  


(1) Mild dehydration


(2) Small bowel obstruction


Goals to Promote Your Health


* To prevent worsening of your condition and complications


* To maintain your health at the optimal level


Directions to Meet Your Goals


*** Take your medications as prescribed


*** Follow your dietary instruction


*** Follow activity as directed








*** Keep your appointments as scheduled


*** Take your immunizations and boosters as scheduled


*** If your symptoms worsen call your PCP, if no PCP go to Urgent Care Center 

or Emergency Room***


*** Smoking is Dangerous to Your Health. Avoid second hand smoke***


***Call the 24-hour hour crisis hotline for domestic abuse at 1-197.299.9651***











Velia Perales MD Feb 3, 2018 11:29

## 2018-02-03 NOTE — MB
cc:

TAWANDA SOLANO M.D.

****

 

 

DATE OF CONSULTATION:

02/02/2018

 

REASON FOR CONSULTATION:

Small bowel obstruction seen on CT scan.

 

HISTORY

This a 72-year-old gentleman who does not really have any previous medical

history.  He states the last two weeks he was not feeling well, he had some

loose bowel movements and then he had some severe cramping in his abdomen.

Were had to call [employer] to get him to the emergency room.  He had a CT scan which

showed a small bowel obstruction and he was admitted and surgery was consulted

to evaluate for small-bowel obstruction.

 

He says he has not had a bowel movement in a couple days and not passing gas.

He says since being in the hospital his pain is much better.  He is not having

cramping pain that he was having when he came by [employer].

 

REVIEW OF SYSTEMS

Review of systems he denies any respiratory, cardiac her previous GI problems.

No  problems or skin problems, No psychiatric derangements.

 

PAST SURGICAL HISTORY

He has had a left inguinal hernia repair.

He has had routine colonoscopies without pathology.

 

PE

IN GENERAL: On physical exam he is alert, oriented, pleasant, talkative

gentleman.

NECK: Neck is supple.

CHEST:  Clear.  No carotid bruits.

HEART: The heart is regular rate.

HEAD, EYES, EARS, NOSE, AND THROAT:  Pupils equal round reactive. ABDOMEN:

Soft.  Mild distension and no rebound or guarding.  He has bowel sounds

throughout.

EXTREMITIES: Strength is all extremities without clubbing, cyanosis or edema.

NEUROLOGIC: He is alert and without focal deficits.

SKIN: Exam shows some tattoos.

 

 

 

 

LABORATORY DATA

He is a white count of 11, H&H of 14, 44 and platelet count 132.

Chemistry showed a little bit of dehydration with a BUN of 25, creatinine 1.2.

 

 

Liver enzymes were all normal.  His lipase 171.

 

Urinalysis is essentially clear.

 

IMAGING STUDIES

CT scan done with No oral contrast, just IV shows what was thought to be maybe

a small bowel obstruction.

 

ASSESSMENT/PLAN

72-year-old gentleman who is fairly healthy.  No surgical history of his

abdomen, only the left inguinal hernia repair.  At this time clinically he does

not have a small bowel obstruction. I think he had this illness may be viral

this been ongoing in this community over the last several weeks.  He is better

with some hydration.  At this time he would like to try some p.o. intake.  We

will check some electrolytes and a KUB in the morning but I anticipate him to

recover without any surgical intervention required.

 

 

 

                              _________________________________

                              MD CAROLINA Ocampo/garry

D:  2/2/2018/8:32 PM

T:  2/3/2018/7:44 AM

Visit #:  [id number]

Job #:  [id number]

## 2018-02-03 NOTE — HHI.PR
Subjective


Remarks


Patient seen and evaluated today in follow-up for abdominal discomfort likely 

due to obstruction which appears to be resolving.  Patient clinically Tory.  

Pain is improved.  Awaiting return of bowel function


Surgical consult appreciated





Objective


Vitals





Vital Signs








  Date Time  Temp Pulse Resp B/P (MAP) Pulse Ox O2 Delivery O2 Flow Rate FiO2


 


2/3/18 00:00 97.4 71 20 113/65 (81) 95   


 


2/2/18 20:00 98.7 82 20 119/71 (87) 94   


 


2/2/18 16:01 98.3 68 18 112/70 (84) 93   


 


2/2/18 12:00 98.0 66 18 106/77 (87) 96   














I/O      


 


 2/2/18 2/2/18 2/2/18 2/3/18 2/3/18 2/3/18





 07:00 15:00 23:00 07:00 15:00 23:00


 


Intake Total 1000 ml  1000 ml 1307 ml  


 


Balance 1000 ml  1000 ml 1307 ml  


 


      


 


Intake Oral    240 ml  


 


IV Total 1000 ml  1000 ml 1067 ml  


 


# Voids   3 2  








Result Diagram:  


2/3/18 0602                                                                    

            2/3/18 0602





Imaging





Last Impressions








Abdomen X-Ray 2/3/18 0600 Signed





Impressions: 





 Service Date/Time:  Saturday, February 3, 2018 09:14 - CONCLUSION:  1. 





 Air-filled small bowel loops with a few air-fluid levels. The overall 

appearance 





 has improved from previous study.     Marco A Leos MD 


 


Abdomen/Pelvis CT 2/2/18 0353 Signed





Impressions: 





 Service Date/Time:  Friday, February 2, 2018 04:08 - CONCLUSION:  1. Small 

bowel 





 obstruction with transition point in the right lower quadrant. 2. Hepatic 

cysts 





 and renal cysts are noted. 3. Nonobstructing left lower pole renal calculus.  

   





 Julio Silveira MD 








Objective Remarks


GENERAL: This is a well-nourished, well-developed patient, in no apparent 

distress.


CARDIOVASCULAR: Regular rate and rhythm without murmurs, gallops, or rubs. 


RESPIRATORY: Clear to auscultation. Breath sounds equal bilaterally. No wheezes

, rales, or rhonchi.  


GASTROINTESTINAL: Abdomen soft, non-tender, nondistended. Normal active bowel 

sounds


MUSCULOSKELETAL: Extremities without clubbing, cyanosis, or edema.


NEURO:  Alert & Oriented x4 to person, place, time, situation.  Moves all ext x4





A/P


Problem List:  


(1) Small bowel obstruction


ICD Code:  K56.609 - Unspecified intestinal obstruction, unspecified as to 

partial versus complete obstruction


Status:  Acute


Plan:  we will continue with IV hydration, 


Improved pain on IV narcotics


Diet has been progressed without difficulty


Repeat abdominal film today is improved





Discharge Planning


Discharge home after return of bowel function











Velia Perales MD Feb 3, 2018 10:06

## 2018-02-03 NOTE — RADRPT
EXAM DATE/TIME:  02/03/2018 09:14 

 

[facility] COMPARISON:     

CT ABDOMEN & PELVIS W CONTRAST, February 02, 2018, 4:08.

 

                     

INDICATIONS :     

Small bowel obstruction

                     

 

MEDICAL HISTORY :     

None.          

 

SURGICAL HISTORY :     

None.   

 

ENCOUNTER:     

Subsequent                                        

 

ACUITY:     

3 days      

 

PAIN SCORE:     

0/10

 

LOCATION:     

Bilateral  abdomen

 

FINDINGS:     

Supine and upright views of the abdomen were performed. Air-filled small bowel loops with a few air-f
luid levels.  No abnormal masses, calcifications, or organomegaly is seen.  The visualized lower lung
s are clear.  No evidence of free intraperitoneal gas.  The osseous structures are unremarkable.

 

CONCLUSION:     

1. Air-filled small bowel loops with a few air-fluid levels. The overall appearance has improved from
 previous study.

 

 

 

 Marco A Leos MD on February 03, 2018 at 9:27           

Board Certified Radiologist.

 This report was verified electronically.

## 2019-12-31 ENCOUNTER — APPOINTMENT (RX ONLY)
Dept: URBAN - METROPOLITAN AREA CLINIC 52 | Facility: CLINIC | Age: 74
Setting detail: DERMATOLOGY
End: 2019-12-31

## 2019-12-31 DIAGNOSIS — D22 MELANOCYTIC NEVI: ICD-10-CM

## 2019-12-31 DIAGNOSIS — D18.0 HEMANGIOMA: ICD-10-CM

## 2019-12-31 DIAGNOSIS — L57.8 OTHER SKIN CHANGES DUE TO CHRONIC EXPOSURE TO NONIONIZING RADIATION: ICD-10-CM

## 2019-12-31 DIAGNOSIS — L85.3 XEROSIS CUTIS: ICD-10-CM

## 2019-12-31 DIAGNOSIS — D69.2 OTHER NONTHROMBOCYTOPENIC PURPURA: ICD-10-CM

## 2019-12-31 DIAGNOSIS — L57.0 ACTINIC KERATOSIS: ICD-10-CM

## 2019-12-31 DIAGNOSIS — L82.1 OTHER SEBORRHEIC KERATOSIS: ICD-10-CM

## 2019-12-31 DIAGNOSIS — L81.4 OTHER MELANIN HYPERPIGMENTATION: ICD-10-CM

## 2019-12-31 PROBLEM — D18.01 HEMANGIOMA OF SKIN AND SUBCUTANEOUS TISSUE: Status: ACTIVE | Noted: 2019-12-31

## 2019-12-31 PROBLEM — D22.9 MELANOCYTIC NEVI, UNSPECIFIED: Status: ACTIVE | Noted: 2019-12-31

## 2019-12-31 PROCEDURE — ? FULL BODY SKIN EXAM

## 2019-12-31 PROCEDURE — ? PRESCRIPTION MEDICATION MANAGEMENT

## 2019-12-31 PROCEDURE — 99203 OFFICE O/P NEW LOW 30 MIN: CPT | Mod: 25

## 2019-12-31 PROCEDURE — ? LIQUID NITROGEN

## 2019-12-31 PROCEDURE — 17000 DESTRUCT PREMALG LESION: CPT

## 2019-12-31 PROCEDURE — 17003 DESTRUCT PREMALG LES 2-14: CPT

## 2019-12-31 PROCEDURE — ? COUNSELING

## 2019-12-31 ASSESSMENT — LOCATION DETAILED DESCRIPTION DERM
LOCATION DETAILED: LEFT MEDIAL FRONTAL SCALP
LOCATION DETAILED: RIGHT MEDIAL INFERIOR CHEST
LOCATION DETAILED: NASAL TIP
LOCATION DETAILED: RIGHT TRAGUS

## 2019-12-31 ASSESSMENT — LOCATION SIMPLE DESCRIPTION DERM
LOCATION SIMPLE: RIGHT EAR
LOCATION SIMPLE: NOSE
LOCATION SIMPLE: LEFT SCALP
LOCATION SIMPLE: CHEST

## 2019-12-31 ASSESSMENT — LOCATION ZONE DERM
LOCATION ZONE: SCALP
LOCATION ZONE: EAR
LOCATION ZONE: NOSE
LOCATION ZONE: TRUNK

## 2020-03-02 ENCOUNTER — APPOINTMENT (RX ONLY)
Dept: URBAN - METROPOLITAN AREA CLINIC 52 | Facility: CLINIC | Age: 75
Setting detail: DERMATOLOGY
End: 2020-03-02

## 2020-03-02 DIAGNOSIS — L57.0 ACTINIC KERATOSIS: ICD-10-CM

## 2020-03-02 DIAGNOSIS — L82.0 INFLAMED SEBORRHEIC KERATOSIS: ICD-10-CM

## 2020-03-02 DIAGNOSIS — L81.4 OTHER MELANIN HYPERPIGMENTATION: ICD-10-CM

## 2020-03-02 DIAGNOSIS — L57.8 OTHER SKIN CHANGES DUE TO CHRONIC EXPOSURE TO NONIONIZING RADIATION: ICD-10-CM

## 2020-03-02 PROCEDURE — ? SUNSCREEN RECOMMENDATIONS

## 2020-03-02 PROCEDURE — ? FULL BODY SKIN EXAM - DECLINED

## 2020-03-02 PROCEDURE — 99214 OFFICE O/P EST MOD 30 MIN: CPT | Mod: 25

## 2020-03-02 PROCEDURE — 17110 DESTRUCTION B9 LES UP TO 14: CPT

## 2020-03-02 PROCEDURE — ? COUNSELING

## 2020-03-02 PROCEDURE — 17000 DESTRUCT PREMALG LESION: CPT | Mod: 59

## 2020-03-02 PROCEDURE — 17003 DESTRUCT PREMALG LES 2-14: CPT | Mod: 59

## 2020-03-02 PROCEDURE — ? LIQUID NITROGEN

## 2020-03-02 ASSESSMENT — LOCATION DETAILED DESCRIPTION DERM
LOCATION DETAILED: LEFT SUPERIOR HELIX
LOCATION DETAILED: RIGHT CENTRAL FRONTAL SCALP
LOCATION DETAILED: RIGHT LATERAL INFERIOR CHEST
LOCATION DETAILED: RIGHT SUPERIOR FOREHEAD
LOCATION DETAILED: RIGHT PROXIMAL RADIAL DORSAL FOREARM
LOCATION DETAILED: LEFT DISTAL CALF
LOCATION DETAILED: RIGHT MEDIAL FRONTAL SCALP
LOCATION DETAILED: LEFT CENTRAL PARIETAL SCALP

## 2020-03-02 ASSESSMENT — LOCATION ZONE DERM
LOCATION ZONE: ARM
LOCATION ZONE: LEG
LOCATION ZONE: FACE
LOCATION ZONE: EAR
LOCATION ZONE: SCALP
LOCATION ZONE: TRUNK

## 2020-03-02 ASSESSMENT — LOCATION SIMPLE DESCRIPTION DERM
LOCATION SIMPLE: LEFT CALF
LOCATION SIMPLE: CHEST
LOCATION SIMPLE: SCALP
LOCATION SIMPLE: LEFT EAR
LOCATION SIMPLE: RIGHT FOREHEAD
LOCATION SIMPLE: RIGHT FOREARM
LOCATION SIMPLE: RIGHT SCALP

## 2020-03-02 NOTE — PROCEDURE: LIQUID NITROGEN
Render Note In Bullet Format When Appropriate: No
Number Of Freeze-Thaw Cycles: 3 freeze-thaw cycles
Detail Level: Zone
Consent: The patient's consent was obtained including but not limited to risks of crusting, scabbing, blistering, scarring, darker or lighter pigmentary change, recurrence, incomplete removal and infection.
Post-Care Instructions: I reviewed with the patient in detail post-care instructions. Patient is to wear sunprotection, and avoid picking at any of the treated lesions. Pt may apply Vaseline to crusted or scabbing areas.
Duration Of Freeze Thaw-Cycle (Seconds): 3
Include Z78.9 (Other Specified Conditions Influencing Health Status) As An Associated Diagnosis?: Yes
Medical Necessity Clause: This procedure was medically necessary because the lesions that were treated were:inflamed
Medical Necessity Information: It is in your best interest to select a reason for this procedure from the list below. All of these items fulfill various CMS LCD requirements except the new and changing color options.
Detail Level: Detailed

## 2020-09-02 ENCOUNTER — APPOINTMENT (RX ONLY)
Dept: URBAN - METROPOLITAN AREA CLINIC 52 | Facility: CLINIC | Age: 75
Setting detail: DERMATOLOGY
End: 2020-09-02

## 2020-09-02 VITALS — TEMPERATURE: 97.8 F

## 2020-09-02 DIAGNOSIS — L57.0 ACTINIC KERATOSIS: ICD-10-CM

## 2020-09-02 DIAGNOSIS — D22 MELANOCYTIC NEVI: ICD-10-CM

## 2020-09-02 DIAGNOSIS — L82.1 OTHER SEBORRHEIC KERATOSIS: ICD-10-CM

## 2020-09-02 DIAGNOSIS — D18.0 HEMANGIOMA: ICD-10-CM

## 2020-09-02 DIAGNOSIS — D69.2 OTHER NONTHROMBOCYTOPENIC PURPURA: ICD-10-CM

## 2020-09-02 DIAGNOSIS — L57.8 OTHER SKIN CHANGES DUE TO CHRONIC EXPOSURE TO NONIONIZING RADIATION: ICD-10-CM

## 2020-09-02 DIAGNOSIS — L81.4 OTHER MELANIN HYPERPIGMENTATION: ICD-10-CM

## 2020-09-02 DIAGNOSIS — L82.0 INFLAMED SEBORRHEIC KERATOSIS: ICD-10-CM

## 2020-09-02 DIAGNOSIS — L85.3 XEROSIS CUTIS: ICD-10-CM

## 2020-09-02 PROBLEM — D22.9 MELANOCYTIC NEVI, UNSPECIFIED: Status: ACTIVE | Noted: 2020-09-02

## 2020-09-02 PROBLEM — D18.01 HEMANGIOMA OF SKIN AND SUBCUTANEOUS TISSUE: Status: ACTIVE | Noted: 2020-09-02

## 2020-09-02 PROCEDURE — ? FULL BODY SKIN EXAM

## 2020-09-02 PROCEDURE — ? LIQUID NITROGEN

## 2020-09-02 PROCEDURE — 17003 DESTRUCT PREMALG LES 2-14: CPT | Mod: 59

## 2020-09-02 PROCEDURE — ? ADDITIONAL NOTES

## 2020-09-02 PROCEDURE — ? COUNSELING

## 2020-09-02 PROCEDURE — ? PRESCRIPTION MEDICATION MANAGEMENT

## 2020-09-02 PROCEDURE — ? FULL BODY SKIN EXAM - DECLINED

## 2020-09-02 PROCEDURE — ? SUNSCREEN RECOMMENDATIONS

## 2020-09-02 PROCEDURE — 17110 DESTRUCTION B9 LES UP TO 14: CPT

## 2020-09-02 PROCEDURE — 99214 OFFICE O/P EST MOD 30 MIN: CPT | Mod: 25

## 2020-09-02 PROCEDURE — 17000 DESTRUCT PREMALG LESION: CPT | Mod: 59

## 2020-09-02 ASSESSMENT — LOCATION DETAILED DESCRIPTION DERM
LOCATION DETAILED: LEFT SUPERIOR FOREHEAD
LOCATION DETAILED: LEFT VENTRAL PROXIMAL FOREARM
LOCATION DETAILED: MID-FRONTAL SCALP
LOCATION DETAILED: RIGHT CENTRAL EYEBROW
LOCATION DETAILED: RIGHT LATERAL SHOULDER
LOCATION DETAILED: EPIGASTRIC SKIN
LOCATION DETAILED: RIGHT CENTRAL FRONTAL SCALP
LOCATION DETAILED: LEFT LATERAL FRONTAL SCALP

## 2020-09-02 ASSESSMENT — LOCATION SIMPLE DESCRIPTION DERM
LOCATION SIMPLE: RIGHT EYEBROW
LOCATION SIMPLE: LEFT FOREARM
LOCATION SIMPLE: ABDOMEN
LOCATION SIMPLE: ANTERIOR SCALP
LOCATION SIMPLE: LEFT SCALP
LOCATION SIMPLE: RIGHT SCALP
LOCATION SIMPLE: RIGHT SHOULDER
LOCATION SIMPLE: LEFT FOREHEAD

## 2020-09-02 ASSESSMENT — LOCATION ZONE DERM
LOCATION ZONE: TRUNK
LOCATION ZONE: FACE
LOCATION ZONE: SCALP
LOCATION ZONE: ARM

## 2020-09-02 NOTE — PROCEDURE: LIQUID NITROGEN
Consent: The patient's consent was obtained including but not limited to risks of crusting, scabbing, blistering, scarring, darker or lighter pigmentary change, recurrence, incomplete removal and infection.
Number Of Freeze-Thaw Cycles: 3 freeze-thaw cycles
Post-Care Instructions: I reviewed with the patient in detail post-care instructions. Patient is to wear sunprotection, and avoid picking at any of the treated lesions. Pt may apply Vaseline to crusted or scabbing areas.
Detail Level: Zone
Render Note In Bullet Format When Appropriate: No
Duration Of Freeze Thaw-Cycle (Seconds): 3
Detail Level: Detailed
Medical Necessity Information: It is in your best interest to select a reason for this procedure from the list below. All of these items fulfill various CMS LCD requirements except the new and changing color options.
Medical Necessity Clause: This procedure was medically necessary because the lesions that were treated were:inflamed
Include Z78.9 (Other Specified Conditions Influencing Health Status) As An Associated Diagnosis?: Yes

## 2021-03-04 ENCOUNTER — APPOINTMENT (RX ONLY)
Dept: URBAN - METROPOLITAN AREA CLINIC 52 | Facility: CLINIC | Age: 76
Setting detail: DERMATOLOGY
End: 2021-03-04

## 2021-03-04 VITALS — TEMPERATURE: 97.8 F

## 2021-03-04 DIAGNOSIS — D22 MELANOCYTIC NEVI: ICD-10-CM | Status: STABLE

## 2021-03-04 DIAGNOSIS — D18.0 HEMANGIOMA: ICD-10-CM | Status: STABLE

## 2021-03-04 DIAGNOSIS — L57.0 ACTINIC KERATOSIS: ICD-10-CM

## 2021-03-04 DIAGNOSIS — L57.8 OTHER SKIN CHANGES DUE TO CHRONIC EXPOSURE TO NONIONIZING RADIATION: ICD-10-CM | Status: STABLE

## 2021-03-04 DIAGNOSIS — L85.3 XEROSIS CUTIS: ICD-10-CM | Status: STABLE

## 2021-03-04 DIAGNOSIS — L81.4 OTHER MELANIN HYPERPIGMENTATION: ICD-10-CM | Status: STABLE

## 2021-03-04 DIAGNOSIS — L82.1 OTHER SEBORRHEIC KERATOSIS: ICD-10-CM | Status: STABLE

## 2021-03-04 DIAGNOSIS — L82.0 INFLAMED SEBORRHEIC KERATOSIS: ICD-10-CM

## 2021-03-04 PROBLEM — D18.01 HEMANGIOMA OF SKIN AND SUBCUTANEOUS TISSUE: Status: ACTIVE | Noted: 2021-03-04

## 2021-03-04 PROBLEM — D22.5 MELANOCYTIC NEVI OF TRUNK: Status: ACTIVE | Noted: 2021-03-04

## 2021-03-04 PROCEDURE — ? ADDITIONAL NOTES

## 2021-03-04 PROCEDURE — ? FULL BODY SKIN EXAM

## 2021-03-04 PROCEDURE — 17000 DESTRUCT PREMALG LESION: CPT | Mod: 59

## 2021-03-04 PROCEDURE — 99213 OFFICE O/P EST LOW 20 MIN: CPT | Mod: 25

## 2021-03-04 PROCEDURE — ? SUNSCREEN RECOMMENDATIONS

## 2021-03-04 PROCEDURE — ? COUNSELING

## 2021-03-04 PROCEDURE — ? SUNSCREEN TREATMENT REGIMEN

## 2021-03-04 PROCEDURE — ? LIQUID NITROGEN

## 2021-03-04 PROCEDURE — 17110 DESTRUCTION B9 LES UP TO 14: CPT

## 2021-03-04 PROCEDURE — 17003 DESTRUCT PREMALG LES 2-14: CPT | Mod: 59

## 2021-03-04 PROCEDURE — ? TREATMENT REGIMEN

## 2021-03-04 ASSESSMENT — LOCATION SIMPLE DESCRIPTION DERM
LOCATION SIMPLE: LEFT PRETIBIAL REGION
LOCATION SIMPLE: RIGHT PRETIBIAL REGION
LOCATION SIMPLE: ABDOMEN
LOCATION SIMPLE: RIGHT EAR
LOCATION SIMPLE: LEFT FOREHEAD
LOCATION SIMPLE: CHEST
LOCATION SIMPLE: UPPER BACK
LOCATION SIMPLE: RIGHT FOREHEAD
LOCATION SIMPLE: LEFT SCALP
LOCATION SIMPLE: RIGHT UPPER BACK

## 2021-03-04 ASSESSMENT — LOCATION DETAILED DESCRIPTION DERM
LOCATION DETAILED: RIGHT LATERAL INFERIOR CHEST
LOCATION DETAILED: RIGHT PROXIMAL PRETIBIAL REGION
LOCATION DETAILED: LEFT SUPERIOR FOREHEAD
LOCATION DETAILED: LEFT MEDIAL FOREHEAD
LOCATION DETAILED: RIGHT SUPERIOR FOREHEAD
LOCATION DETAILED: LEFT CENTRAL FRONTAL SCALP
LOCATION DETAILED: EPIGASTRIC SKIN
LOCATION DETAILED: RIGHT SUPERIOR HELIX
LOCATION DETAILED: RIGHT SUPERIOR MEDIAL FOREHEAD
LOCATION DETAILED: SUPERIOR THORACIC SPINE
LOCATION DETAILED: RIGHT SUPERIOR UPPER BACK
LOCATION DETAILED: LEFT PROXIMAL PRETIBIAL REGION

## 2021-03-04 ASSESSMENT — LOCATION ZONE DERM
LOCATION ZONE: TRUNK
LOCATION ZONE: EAR
LOCATION ZONE: LEG
LOCATION ZONE: SCALP
LOCATION ZONE: FACE

## 2021-03-04 NOTE — PROCEDURE: LIQUID NITROGEN
Duration Of Freeze Thaw-Cycle (Seconds): 3
Number Of Freeze-Thaw Cycles: 3 freeze-thaw cycles
Render Note In Bullet Format When Appropriate: No
Consent: The patient's consent was obtained including but not limited to risks of crusting, scabbing, blistering, scarring, darker or lighter pigmentary change, recurrence, incomplete removal and infection.
Detail Level: Zone
Post-Care Instructions: I reviewed with the patient in detail post-care instructions. Patient is to wear sunprotection, and avoid picking at any of the treated lesions. Pt may apply Vaseline to crusted or scabbing areas.
Detail Level: Detailed
Medical Necessity Clause: This procedure was medically necessary because the lesions that were treated were:inflamed
Include Z78.9 (Other Specified Conditions Influencing Health Status) As An Associated Diagnosis?: Yes
Medical Necessity Information: It is in your best interest to select a reason for this procedure from the list below. All of these items fulfill various CMS LCD requirements except the new and changing color options.

## 2021-06-10 ENCOUNTER — APPOINTMENT (RX ONLY)
Dept: URBAN - METROPOLITAN AREA CLINIC 52 | Facility: CLINIC | Age: 76
Setting detail: DERMATOLOGY
End: 2021-06-10

## 2021-06-10 DIAGNOSIS — L82.1 OTHER SEBORRHEIC KERATOSIS: ICD-10-CM | Status: STABLE

## 2021-06-10 DIAGNOSIS — L85.3 XEROSIS CUTIS: ICD-10-CM | Status: STABLE

## 2021-06-10 DIAGNOSIS — L57.8 OTHER SKIN CHANGES DUE TO CHRONIC EXPOSURE TO NONIONIZING RADIATION: ICD-10-CM | Status: STABLE

## 2021-06-10 DIAGNOSIS — D18.0 HEMANGIOMA: ICD-10-CM | Status: STABLE

## 2021-06-10 DIAGNOSIS — L57.0 ACTINIC KERATOSIS: ICD-10-CM

## 2021-06-10 DIAGNOSIS — L81.4 OTHER MELANIN HYPERPIGMENTATION: ICD-10-CM | Status: STABLE

## 2021-06-10 DIAGNOSIS — D22 MELANOCYTIC NEVI: ICD-10-CM | Status: STABLE

## 2021-06-10 PROBLEM — D22.5 MELANOCYTIC NEVI OF TRUNK: Status: ACTIVE | Noted: 2021-06-10

## 2021-06-10 PROBLEM — D18.01 HEMANGIOMA OF SKIN AND SUBCUTANEOUS TISSUE: Status: ACTIVE | Noted: 2021-06-10

## 2021-06-10 PROCEDURE — ? TREATMENT REGIMEN

## 2021-06-10 PROCEDURE — ? SUNSCREEN TREATMENT REGIMEN

## 2021-06-10 PROCEDURE — ? FULL BODY SKIN EXAM

## 2021-06-10 PROCEDURE — 99213 OFFICE O/P EST LOW 20 MIN: CPT | Mod: 25

## 2021-06-10 PROCEDURE — ? SUNSCREEN RECOMMENDATIONS

## 2021-06-10 PROCEDURE — ? LIQUID NITROGEN

## 2021-06-10 PROCEDURE — ? ADDITIONAL NOTES

## 2021-06-10 PROCEDURE — ? COUNSELING

## 2021-06-10 PROCEDURE — 17000 DESTRUCT PREMALG LESION: CPT

## 2021-06-10 ASSESSMENT — LOCATION SIMPLE DESCRIPTION DERM
LOCATION SIMPLE: UPPER BACK
LOCATION SIMPLE: LEFT PRETIBIAL REGION
LOCATION SIMPLE: ABDOMEN
LOCATION SIMPLE: RIGHT FOREHEAD
LOCATION SIMPLE: RIGHT UPPER BACK
LOCATION SIMPLE: RIGHT PRETIBIAL REGION
LOCATION SIMPLE: CHEST

## 2021-06-10 ASSESSMENT — LOCATION ZONE DERM
LOCATION ZONE: TRUNK
LOCATION ZONE: LEG
LOCATION ZONE: FACE

## 2021-06-10 ASSESSMENT — LOCATION DETAILED DESCRIPTION DERM
LOCATION DETAILED: LEFT PROXIMAL PRETIBIAL REGION
LOCATION DETAILED: SUPERIOR THORACIC SPINE
LOCATION DETAILED: EPIGASTRIC SKIN
LOCATION DETAILED: RIGHT PROXIMAL PRETIBIAL REGION
LOCATION DETAILED: LEFT LATERAL SUPERIOR CHEST
LOCATION DETAILED: RIGHT SUPERIOR FOREHEAD
LOCATION DETAILED: RIGHT SUPERIOR UPPER BACK

## 2021-06-10 NOTE — PROCEDURE: LIQUID NITROGEN
Number Of Freeze-Thaw Cycles: 3 freeze-thaw cycles
Post-Care Instructions: I reviewed with the patient in detail post-care instructions. Patient is to wear sunprotection, and avoid picking at any of the treated lesions. Pt may apply Vaseline to crusted or scabbing areas.
Render Note In Bullet Format When Appropriate: No
Detail Level: Zone
Duration Of Freeze Thaw-Cycle (Seconds): 3
Consent: The patient's consent was obtained including but not limited to risks of crusting, scabbing, blistering, scarring, darker or lighter pigmentary change, recurrence, incomplete removal and infection.

## 2021-06-10 NOTE — PROCEDURE: TREATMENT REGIMEN
Increase Regimen: Increase moisturizing skin at least once daily. Recommend to use cerave or eucerin cream.
Detail Level: Zone
Action 4: Continue

## 2021-07-19 NOTE — PROCEDURE: MIPS QUALITY
Quality 47: Advance Care Plan: Advance Care Planning discussed and documented; advance care plan or surrogate decision maker documented in the medical record.
· Continue home dose Dilaudid 4mg PO TID PRN  · Active prescription verified in [facility]   Last filled #90 x 30d on 7/10/21
Quality 226: Preventive Care And Screening: Tobacco Use: Screening And Cessation Intervention: Patient screened for tobacco use and is an ex/non-smoker
Quality 130: Documentation Of Current Medications In The Medical Record: Current Medications Documented
Quality 431: Preventive Care And Screening: Unhealthy Alcohol Use - Screening: Patient screened for unhealthy alcohol use using a single question and scores less than 2 times per year
Detail Level: Detailed